# Patient Record
Sex: MALE | Race: BLACK OR AFRICAN AMERICAN | NOT HISPANIC OR LATINO | URBAN - METROPOLITAN AREA
[De-identification: names, ages, dates, MRNs, and addresses within clinical notes are randomized per-mention and may not be internally consistent; named-entity substitution may affect disease eponyms.]

---

## 2018-08-01 ENCOUNTER — INPATIENT (INPATIENT)
Facility: HOSPITAL | Age: 38
LOS: 1 days | Discharge: HOME | DRG: 682 | End: 2018-08-03
Attending: HOSPITALIST | Admitting: HOSPITALIST
Payer: COMMERCIAL

## 2018-08-01 VITALS
SYSTOLIC BLOOD PRESSURE: 129 MMHG | TEMPERATURE: 97 F | HEART RATE: 75 BPM | RESPIRATION RATE: 20 BRPM | OXYGEN SATURATION: 99 % | DIASTOLIC BLOOD PRESSURE: 77 MMHG

## 2018-08-01 DIAGNOSIS — I10 ESSENTIAL (PRIMARY) HYPERTENSION: ICD-10-CM

## 2018-08-01 DIAGNOSIS — I77.0 ARTERIOVENOUS FISTULA, ACQUIRED: Chronic | ICD-10-CM

## 2018-08-01 DIAGNOSIS — D64.9 ANEMIA, UNSPECIFIED: ICD-10-CM

## 2018-08-01 DIAGNOSIS — R63.8 OTHER SYMPTOMS AND SIGNS CONCERNING FOOD AND FLUID INTAKE: ICD-10-CM

## 2018-08-01 DIAGNOSIS — Z29.9 ENCOUNTER FOR PROPHYLACTIC MEASURES, UNSPECIFIED: ICD-10-CM

## 2018-08-01 DIAGNOSIS — Z91.89 OTHER SPECIFIED PERSONAL RISK FACTORS, NOT ELSEWHERE CLASSIFIED: ICD-10-CM

## 2018-08-01 DIAGNOSIS — T86.11 KIDNEY TRANSPLANT REJECTION: Chronic | ICD-10-CM

## 2018-08-01 DIAGNOSIS — N18.6 END STAGE RENAL DISEASE: ICD-10-CM

## 2018-08-01 DIAGNOSIS — R74.8 ABNORMAL LEVELS OF OTHER SERUM ENZYMES: ICD-10-CM

## 2018-08-01 LAB
ALBUMIN SERPL ELPH-MCNC: 4.3 G/DL — SIGNIFICANT CHANGE UP (ref 3.3–5)
ALP SERPL-CCNC: 155 U/L — HIGH (ref 40–120)
ALT FLD-CCNC: 6 U/L — LOW (ref 10–45)
ANION GAP SERPL CALC-SCNC: 22 MMOL/L — HIGH (ref 5–17)
AST SERPL-CCNC: 17 U/L — SIGNIFICANT CHANGE UP (ref 10–40)
BASOPHILS NFR BLD AUTO: 0.2 % — SIGNIFICANT CHANGE UP (ref 0–2)
BILIRUB SERPL-MCNC: 0.5 MG/DL — SIGNIFICANT CHANGE UP (ref 0.2–1.2)
BUN SERPL-MCNC: 66 MG/DL — HIGH (ref 7–23)
CALCIUM SERPL-MCNC: 8.5 MG/DL — SIGNIFICANT CHANGE UP (ref 8.4–10.5)
CHLORIDE SERPL-SCNC: 94 MMOL/L — LOW (ref 96–108)
CO2 SERPL-SCNC: 21 MMOL/L — LOW (ref 22–31)
CREAT SERPL-MCNC: 14.32 MG/DL — HIGH (ref 0.5–1.3)
EOSINOPHIL NFR BLD AUTO: 2.1 % — SIGNIFICANT CHANGE UP (ref 0–6)
FERRITIN SERPL-MCNC: 1334 NG/ML — HIGH (ref 30–400)
GLUCOSE SERPL-MCNC: 98 MG/DL — SIGNIFICANT CHANGE UP (ref 70–99)
HBV CORE AB SER-ACNC: SIGNIFICANT CHANGE UP
HBV SURFACE AB SER-ACNC: REACTIVE — SIGNIFICANT CHANGE UP
HBV SURFACE AG SER-ACNC: SIGNIFICANT CHANGE UP
HCT VFR BLD CALC: 25 % — LOW (ref 39–50)
HCV AB S/CO SERPL IA: 0.12 S/CO — SIGNIFICANT CHANGE UP
HCV AB SERPL-IMP: SIGNIFICANT CHANGE UP
HGB BLD-MCNC: 7.7 G/DL — LOW (ref 13–17)
HIV 1+2 AB+HIV1 P24 AG SERPL QL IA: SIGNIFICANT CHANGE UP
IRON SATN MFR SERPL: 19 % — SIGNIFICANT CHANGE UP (ref 16–55)
IRON SATN MFR SERPL: 33 UG/DL — LOW (ref 45–165)
LIDOCAIN IGE QN: 86 U/L — HIGH (ref 7–60)
LYMPHOCYTES # BLD AUTO: 13.7 % — SIGNIFICANT CHANGE UP (ref 13–44)
MCHC RBC-ENTMCNC: 29.8 PG — SIGNIFICANT CHANGE UP (ref 27–34)
MCHC RBC-ENTMCNC: 30.8 G/DL — LOW (ref 32–36)
MCV RBC AUTO: 96.9 FL — SIGNIFICANT CHANGE UP (ref 80–100)
MONOCYTES NFR BLD AUTO: 10.8 % — SIGNIFICANT CHANGE UP (ref 2–14)
NEUTROPHILS NFR BLD AUTO: 73.2 % — SIGNIFICANT CHANGE UP (ref 43–77)
PHOSPHATE SERPL-MCNC: 6.3 MG/DL — HIGH (ref 2.5–4.5)
PLATELET # BLD AUTO: 129 K/UL — LOW (ref 150–400)
POTASSIUM SERPL-MCNC: 5.5 MMOL/L — HIGH (ref 3.5–5.3)
POTASSIUM SERPL-SCNC: 5.5 MMOL/L — HIGH (ref 3.5–5.3)
PROT SERPL-MCNC: 7.3 G/DL — SIGNIFICANT CHANGE UP (ref 6–8.3)
RBC # BLD: 2.58 M/UL — LOW (ref 4.2–5.8)
RBC # FLD: 15.4 % — SIGNIFICANT CHANGE UP (ref 10.3–16.9)
SODIUM SERPL-SCNC: 137 MMOL/L — SIGNIFICANT CHANGE UP (ref 135–145)
TIBC SERPL-MCNC: 175 UG/DL — LOW (ref 220–430)
TRANSFERRIN SERPL-MCNC: 125 MG/DL — LOW (ref 200–360)
UIBC SERPL-MCNC: 142 UG/DL — SIGNIFICANT CHANGE UP (ref 110–370)
WBC # BLD: 4.4 K/UL — SIGNIFICANT CHANGE UP (ref 3.8–10.5)
WBC # FLD AUTO: 4.4 K/UL — SIGNIFICANT CHANGE UP (ref 3.8–10.5)

## 2018-08-01 PROCEDURE — 93010 ELECTROCARDIOGRAM REPORT: CPT

## 2018-08-01 PROCEDURE — 99223 1ST HOSP IP/OBS HIGH 75: CPT | Mod: GC

## 2018-08-01 PROCEDURE — 99285 EMERGENCY DEPT VISIT HI MDM: CPT | Mod: 25

## 2018-08-01 PROCEDURE — 99222 1ST HOSP IP/OBS MODERATE 55: CPT

## 2018-08-01 RX ORDER — LABETALOL HCL 100 MG
400 TABLET ORAL EVERY 8 HOURS
Qty: 0 | Refills: 0 | Status: DISCONTINUED | OUTPATIENT
Start: 2018-08-01 | End: 2018-08-03

## 2018-08-01 RX ORDER — FAMOTIDINE 10 MG/ML
20 INJECTION INTRAVENOUS ONCE
Qty: 0 | Refills: 0 | Status: DISCONTINUED | OUTPATIENT
Start: 2018-08-01 | End: 2018-08-01

## 2018-08-01 RX ORDER — HYDRALAZINE HCL 50 MG
1 TABLET ORAL
Qty: 0 | Refills: 0 | COMMUNITY

## 2018-08-01 RX ORDER — ERYTHROPOIETIN 10000 [IU]/ML
10000 INJECTION, SOLUTION INTRAVENOUS; SUBCUTANEOUS ONCE
Qty: 0 | Refills: 0 | Status: COMPLETED | OUTPATIENT
Start: 2018-08-01 | End: 2018-08-01

## 2018-08-01 RX ORDER — FERROUS SULFATE 325(65) MG
1 TABLET ORAL
Qty: 0 | Refills: 0 | COMMUNITY

## 2018-08-01 RX ORDER — NIFEDIPINE 30 MG
1 TABLET, EXTENDED RELEASE 24 HR ORAL
Qty: 0 | Refills: 0 | COMMUNITY

## 2018-08-01 RX ORDER — FAMOTIDINE 10 MG/ML
20 INJECTION INTRAVENOUS DAILY
Qty: 0 | Refills: 0 | Status: DISCONTINUED | OUTPATIENT
Start: 2018-08-01 | End: 2018-08-01

## 2018-08-01 RX ORDER — LIDOCAINE 4 G/100G
5 CREAM TOPICAL ONCE
Qty: 0 | Refills: 0 | Status: COMPLETED | OUTPATIENT
Start: 2018-08-01 | End: 2018-08-01

## 2018-08-01 RX ORDER — HYDRALAZINE HCL 50 MG
100 TABLET ORAL EVERY 8 HOURS
Qty: 0 | Refills: 0 | Status: DISCONTINUED | OUTPATIENT
Start: 2018-08-01 | End: 2018-08-01

## 2018-08-01 RX ORDER — ONDANSETRON 8 MG/1
4 TABLET, FILM COATED ORAL ONCE
Qty: 0 | Refills: 0 | Status: COMPLETED | OUTPATIENT
Start: 2018-08-01 | End: 2018-08-01

## 2018-08-01 RX ORDER — LABETALOL HCL 100 MG
2 TABLET ORAL
Qty: 0 | Refills: 0 | COMMUNITY

## 2018-08-01 RX ORDER — NIFEDIPINE 30 MG
60 TABLET, EXTENDED RELEASE 24 HR ORAL
Qty: 0 | Refills: 0 | Status: DISCONTINUED | OUTPATIENT
Start: 2018-08-01 | End: 2018-08-01

## 2018-08-01 RX ORDER — LABETALOL HCL 100 MG
400 TABLET ORAL EVERY 8 HOURS
Qty: 0 | Refills: 0 | Status: DISCONTINUED | OUTPATIENT
Start: 2018-08-01 | End: 2018-08-01

## 2018-08-01 RX ORDER — NIFEDIPINE 30 MG
60 TABLET, EXTENDED RELEASE 24 HR ORAL
Qty: 0 | Refills: 0 | Status: DISCONTINUED | OUTPATIENT
Start: 2018-08-01 | End: 2018-08-03

## 2018-08-01 RX ORDER — FERROUS SULFATE 325(65) MG
325 TABLET ORAL EVERY 12 HOURS
Qty: 0 | Refills: 0 | Status: DISCONTINUED | OUTPATIENT
Start: 2018-08-01 | End: 2018-08-03

## 2018-08-01 RX ORDER — ONDANSETRON 8 MG/1
4 TABLET, FILM COATED ORAL ONCE
Qty: 0 | Refills: 0 | Status: DISCONTINUED | OUTPATIENT
Start: 2018-08-01 | End: 2018-08-01

## 2018-08-01 RX ORDER — HYDRALAZINE HCL 50 MG
100 TABLET ORAL EVERY 8 HOURS
Qty: 0 | Refills: 0 | Status: DISCONTINUED | OUTPATIENT
Start: 2018-08-01 | End: 2018-08-03

## 2018-08-01 RX ADMIN — Medication 400 MILLIGRAM(S): at 20:26

## 2018-08-01 RX ADMIN — Medication 100 MILLIGRAM(S): at 22:59

## 2018-08-01 RX ADMIN — ERYTHROPOIETIN 10000 UNIT(S): 10000 INJECTION, SOLUTION INTRAVENOUS; SUBCUTANEOUS at 17:30

## 2018-08-01 RX ADMIN — Medication 0.3 MILLIGRAM(S): at 20:23

## 2018-08-01 RX ADMIN — Medication 100 MILLIGRAM(S): at 20:27

## 2018-08-01 RX ADMIN — Medication 0.3 MILLIGRAM(S): at 22:59

## 2018-08-01 RX ADMIN — Medication 400 MILLIGRAM(S): at 23:13

## 2018-08-01 NOTE — H&P ADULT - NSHPOUTPATIENTPROVIDERS_GEN_ALL_CORE
has no PCP; recent renal doctor at HD was 1mo ago at [facility] on [address] and [address]

## 2018-08-01 NOTE — CONSULT NOTE ADULT - ASSESSMENT
35 year old male with pmhx HTN and ESRD on HD admitted for HD and DVT.    US of RUE reveals thrombus extending from the right subclavian vein to the basilic vein.  -refused heparin infusion or anticoagulation.     Patient was educated re: risk of untreated dvt including , PE and death. pt understood the risks and stated he still did not want anticoagulation. 38M PMH HTN, ESRD (HD M/W/F, occasionally makes minimal urine) 2/2 PCKD w/ h/o renal transplant 2009 (currently off anti-rejection meds) presented c/o N/V w/ electrolyte abnormalities and need for HD

## 2018-08-01 NOTE — H&P ADULT - PROBLEM SELECTOR PLAN 2
normocytic anemia; pt on iron supplements at home; possible iron deficiency vs ACD; iron studies consistent w/ ACD  -c/w iron supplementation  -f/u renal recs for possible possible procrit needs in future

## 2018-08-01 NOTE — ED PROVIDER NOTE - CHPI ED SYMPTOMS NEG
no dysuria/no fever/no blood in stool/no chills/no burning urination/no abdominal distension/no diarrhea

## 2018-08-01 NOTE — ED PROVIDER NOTE - OBJECTIVE STATEMENT
39 y/o m with h/o HTN , renal failure on dialysis M,W, F. He state of needing dialysis and has been having nausea and epigastric pain since last night. He admit of vomiting x 1 episode. Report of going to [facility] for three weeks for dialysis due to no dialysis center. He admit of being admitted but he left with no arrangements of a continuos dialysis center. Denies fever, diarrhea, sob, chest pain. Makes a small amount of urine. Denies alcohol or drug use.

## 2018-08-01 NOTE — H&P ADULT - FAMILY HISTORY
Mother  Still living? Unknown  Family history of hypertension, Age at diagnosis: Age Unknown  Family history of renal failure, Age at diagnosis: Age Unknown     Sibling  Still living? Unknown  Family history of cancer in brother, Age at diagnosis: Age Unknown

## 2018-08-01 NOTE — H&P ADULT - HISTORY OF PRESENT ILLNESS
38M PMH HTN, ESRD (HD M/W/F) 2/2 PCKD w/ h/o renal transplant 2009 (currently off anti-rejection meds) presents to ED c/o 1d nausea associated w/ acid reflux w/ epigastric pain and 1ep of vomitus NBNB. Pt states his sx were relieved w/ vomiting. Pt last had HD on Monday at [facility] and presents to the ED stating he needs HD. Pt has had HD center set up in the past at [facility]. Due to an argument since 2015 as pt was hospitalized at [facility] for possible hypertensive emergency and told that he no longer needs to continue them presents 38M PMH HTN, ESRD (HD M/W/F, occasionally makes minimal urine) 2/2 PCKD w/ h/o renal transplant 2009 (currently off anti-rejection meds) presents to ED c/o 1d nausea associated w/ acid reflux w/ epigastric pain and 1ep of vomitus NBNB. Pt states his sx were relieved w/ vomiting. Denies F/C/S, CP/pressure, dyspnea, diaphoresis, palpitations. Pt last had HD on Monday at [facility] and presents to the ED stating he needs HD. Pt has had HD center set up in the past at [facility]. Due to an argument at facility, he did not return there and decided to have his HD sessions at various hospitals. Since 2015 as pt was hospitalized at [facility] for possible hypertensive emergency and told that he no longer needs to continue them.    ROS: at this time denies HA, F/C/S, N/V, CP/pressure, palpitations, dyspnea, abdominal pain, diarrhea/constipation  -has discomfort when urination occurs which happened Monday    ED VS: Tmax 98.1F, HR: 70-97, BP: 124//92, RR: 18-20, SpO2: 98%-99% RA  labs: Hb 7.7, K 5.5, Ph 6.3, Bicarb 21, AG 22, Cr 14.32, BUN 66, Alk Phos 155, Lipase 80s  EKG: NSR  -renal consulted in ED and to set up for HD

## 2018-08-01 NOTE — H&P ADULT - PROBLEM SELECTOR PLAN 7
1) PCP Contacted on Admission: (Y/N) --> Name & Phone #: pt w/o PCP or renal doctor  2) Date of Contact with PCP:  3) PCP Contacted at Discharge: (Y/N, N/A)  4) Summary of Handoff Given to PCP:   5) Post-Discharge Appointment Date and Location:

## 2018-08-01 NOTE — H&P ADULT - NSHPSOCIALHISTORY_GEN_ALL_CORE
Tobacco: quit 2001 1/2 pk of black and mild; started at age 14-15 w/ intermittent quitting in between  EtOH: remote use; rare use  Rec: none

## 2018-08-01 NOTE — H&P ADULT - NSHPPHYSICALEXAM_GEN_ALL_CORE
General: WDWN NAD  HEENT: no JVD, no LAD, no carotid bruits  Cardiac: RRR, S1/S2, no M/R/G  Pulm: CTA b/l  Abdomen: BS present, soft, NTND  Extremities: WWP, no edema in LE b/l General: WDWN NAD  HEENT: no JVD, no LAD, no carotid bruits  Cardiac: RRR, S1/S2, no M/R/G  Pulm: CTA b/l  Abdomen: BS present, soft, NTND  Extremities: WWP, no edema in LE b/l  Psych: normal affect  skin: no rash  Neuro: no focal deficits, grossly intact

## 2018-08-01 NOTE — H&P ADULT - PROBLEM SELECTOR PLAN 1
pt w/ hyperkalemia, elevated AG, decreased bicarb, elevated BUN/Cr 2/2 ESRD in need for HD  -seen by renal  -currently undergoing HD pt w/ hyperkalemia, elevated AG, decreased bicarb, elevated BUN/Cr 2/2 ESRD in need for HD  -seen by renal  -currently undergoing HD  will need to establish HD center - has been to [facility] in the past  -Hepatitis B/C neg, HIV neg  -f/u quantiferon

## 2018-08-01 NOTE — ED PROVIDER NOTE - ATTENDING CONTRIBUTION TO CARE
39 yo m presents to ED with concern for abdominal pain with n/v.  Pain located to epigastric area, present x 2 days.  Notes one episode of non bloody, non bilious emesis.  On my face to face ED eval, patient is non toxic in appearance.  HRRR, lungs clear.  Abd soft.  No reproducible ttp throughout.  Labs reviewed.  Given patient is due for dialysis and K slightly elevated anticipate possible need for admission as he missed his appointment today.  Patient appears stable and it is not felt he warrants further abd imaging at this time.  Will admit at this time for dialysis.

## 2018-08-01 NOTE — H&P ADULT - NSHPLABSRESULTS_GEN_ALL_CORE
7.7    4.4   )-----------( 129      ( 01 Aug 2018 12:56 )             25.0   08-01    137  |  94<L>  |  66<H>  ----------------------------<  98  5.5<H>   |  21<L>  |  14.32<H>    Ca    8.5      01 Aug 2018 12:56  Phos  6.3     08-01    TPro  7.3  /  Alb  4.3  /  TBili  0.5  /  DBili  x   /  AST  17  /  ALT  6<L>  /  AlkPhos  155<H>  08-01    Hepatitis B Core Antibody, Total (08.01.18 @ 16:27)    Hepatitis B Core Antibody, Total: Nonreact  Hepatitis B Surface Antigen (08.01.18 @ 16:27)    Hepatitis B Surface Antigen: Nonreact  Hepatitis B Surface Antibody (08.01.18 @ 16:27)    Hepatitis B Surface Antibody: Reactive  Hepatitis C Antibody Test (08.01.18 @ 16:27)    Hepatitis C Virus S/CO Ratio: 0.12 S/CO    Hepatitis C Virus Interpretation: Nonreact: Hepatitis C AB    HIV-1/2 Antigen/Antibody Screen by [facility] (08.01.18 @ 16:27)    HIV-1/2 Combo Result: Nonreact:

## 2018-08-01 NOTE — CONSULT NOTE ADULT - ATTENDING COMMENTS
Seen on HD  Increased UF to 3-3.5kg for dbp 110's on dialysis, per pt usually gets 3-4kg removed on dialysis. Increased session time to 3.5hrs  Please get social wk to see pt re: placement at dialysis unit. He used to go to [facility] on [address] 1 month ago but says there was a disagreement there and has since been going to various ER's.  Epo with HD

## 2018-08-01 NOTE — H&P ADULT - ATTENDING COMMENTS
Pt seen and examined at bedside on 8/1/2018 @ 2200    Agree with HPI, ROS as above. Patient has no complaints. 12 point ROS is negative.    VS, Labs, FH, SH, allergies, medications, imaging reviewed. I personally reviewed the EKG - R axis deviation, possible q waves in anterior leads. I reviewed patient's previous records was seen at [facility] in 2015 at which time he was anemic but refused transfusion and signed out AMA. Agree with physical exam as above     A/P: 38M PMH HTN, ESRD (HD M/W/F, occasionally makes minimal urine) 2/2 PCKD w/ h/o renal transplant 2009 (currently off anti-rejection meds) presented c/o N/V w/ electrolyte abnormalities and need for HD    **ESRD  -HD performed by renal  -SW in AM to help patient set up permanent HD center  -Renal following, appreciate recs    **Hypertensive urgency  -Patient with elevated BP but asymptomatic, 2/2 to not taking his BP meds earlier in the day  -Will give patient back home medications, recheck BP afterwards    Plan otherwise as outlined above.....

## 2018-08-01 NOTE — ED ADULT NURSE NOTE - OBJECTIVE STATEMENT
Patient is a 39yo M, BIBA, AAOx3, in NAD, vitals stable, complaining of abdominal pain, body aches, nausea, vomiting and headache.  Patient requesting pain medicine and dialysis.  Patient presents with LUE AV fistula, +bruit/+thrill, M/W/F dialysis, with last treatment on Monday.  Patient denies any CP, SOB, dizziness, diarrhea, fevers, chills or any other complaints.

## 2018-08-01 NOTE — ED PROVIDER NOTE - MEDICAL DECISION MAKING DETAILS
Patient with renal failure on dialysis missed today. Admitted for dialysis today and possible reestablishment of dialysis center. Well appearing, NAD and VSS.

## 2018-08-01 NOTE — CONSULT NOTE ADULT - SUBJECTIVE AND OBJECTIVE BOX
Patient is a 38y old  Male who presents with a chief complaint of need for dialysis (01 Aug 2018 17:11)  Patient has been getting dialysis reportedly since 2015. He reported that his last dialysis was at [facility] on Monday where he had been admitted for a "dental procedure".      HPI:  38M PMH HTN, ESRD (HD M/W/F) 2/2 PCKD w/ h/o renal transplant 2009 (currently off anti-rejection meds) presents to ED c/o 1d nausea associated w/ acid reflux w/ epigastric pain and 1ep of vomitus NBNB. Pt states his sx were relieved w/ vomiting. Pt last had HD on Monday at [facility] and presents to the ED stating he needs HD. Pt has had HD center set up in the past at [facility]. Due to an argument since 2015 as pt was hospitalized at [facility] for possible hypertensive emergency and told that he no longer needs to continue them presents (01 Aug 2018 17:11)      PAST MEDICAL & SURGICAL HISTORY:  Hypertension secondary to other renal disorders  Homeless  Dialysis patient  Chronic rejection of kidney transplant  AVF (arteriovenous fistula)      Allergies    No Known Drug Allergies  shellfish (Rash)    Intolerances        FAMILY HISTORY:  Family history of cancer in brother (Sibling)  Family history of renal failure (Mother)  Family history of hypertension (Mother)      SOCIAL HISTORY: NAD    MEDICATIONS  (STANDING):  cloNIDine 0.3 milliGRAM(s) Oral every 8 hours  ferrous    sulfate 325 milliGRAM(s) Oral every 12 hours  hydrALAZINE 100 milliGRAM(s) Oral every 8 hours  labetalol 400 milliGRAM(s) Oral every 8 hours  NIFEdipine XL 60 milliGRAM(s) Oral <User Schedule>    MEDICATIONS  (PRN):      REVIEW OF SYSTEMS:    CONSTITUTIONAL: No fever or chills, No fatigue or tiredness.  EYES: No blurred or double vision.  ENT: No recent URI or sore throat  RESPIRATORY: No shortness of breath, cough, hemoptysis  CARDIOVASCULAR: No Chest pain or shortness of breath  GASTROINTESTINAL: NO abdominal or flank pain, No nausea or vomiting, No diarrhea  GENITOURINARY: No dysuria or urinary burning, No difficulty passing urine, No hematuria  NEUROLOGICAL: No headaches or blurred vision  SKIN: No skin rashes   MUSCULOSKELETAL: No arthralgia, Joint pain, leg edema, No muscle pains        PHYSICAL EXAM:    Middle aged male in no distress  CVS: S1S2+ No MRG  Resp: chest clear, no creps, no wheeze  Abd: soft, nontender  Ext: no pedal edema    Access: LUE AV graft              LABS:                                                   08-01-18 @ 12:56    137  |  94<L>  |  66<H>  ----------------------------<  98  5.5<H>   |  21<L>  |  14.32<H>    Ca    8.5      01 Aug 2018 12:56  Phos  6.3     08-01    TPro  7.3  /  Alb  4.3  /  TBili  0.5  /  DBili  x   /  AST  17  /  ALT  6<L>  /  AlkPhos  155<H>  08-01                          7.7    4.4   )-----------( 129      ( 01 Aug 2018 12:56 )             25.0     CBC Full  -  ( 01 Aug 2018 12:56 )  WBC Count : 4.4 K/uL  Hemoglobin : 7.7 g/dL  Hematocrit : 25.0 %  Platelet Count - Automated : 129 K/uL  Mean Cell Volume : 96.9 fL                  RADIOLOGY & ADDITIONAL TESTS:      < from: US Duplex Venous Upper Ext Ltd, Right (10.15.15 @ 14:24) >   US DPLX UPR EXT VEINS LTD RT                             PROCEDURE DATE:  10/15/2015                   INTERPRETATION:  VENOUS DUPLEX DOPPLER ULTRASOUND OF THE VEINS OF THE   RIGHT NECK AND RIGHT UPPER EXTREMITY dated 10/15/2015 2:24 PM    INDICATION: Swelling. Assess for thrombus.     TECHNIQUE: Duplex Doppler evaluation including gray-scale ultrasound   imaging, color flow Doppler imaging, and Doppler spectral analysis of the   veins of the right neck and right upper extremity was performed.    COMPARISON: None.    FINDINGS: The right internal jugular vein is patent and free of thrombus.    A large amount of thrombus is noted within the subclavian vein, axillary   vein, brachial veins, and basilic vein. No thrombus is seen in the   visualized portion of the cephalic vein. The palpable abnormality in the   patient's right axilla corresponds to the thrombosed axillary vein.    IMPRESSION: Extensive right upper extremity thrombosis.      Findings were discussed with Aditya Lutz on 10/15/2015 2:43 PM    with read back.    < end of copied text >

## 2018-08-01 NOTE — H&P ADULT - ASSESSMENT
38M PMH HTN, ESRD (HD M/W/F, occasionally makes minimal urine) 2/2 PCKD w/ h/o renal transplant 2009 (currently off anti-rejection meds) presented c/o N/V w/ electrolyte abnormalities and need for HD

## 2018-08-01 NOTE — ED ADULT NURSE NOTE - NSIMPLEMENTINTERV_GEN_ALL_ED
Implemented All Universal Safety Interventions:  [place] to call system. Call bell, personal items and telephone within reach. Instruct patient to call for assistance. Room bathroom lighting operational. Non-slip footwear when patient is off stretcher. Physically safe environment: no spills, clutter or unnecessary equipment. Stretcher in lowest position, wheels locked, appropriate side rails in place.

## 2018-08-02 DIAGNOSIS — I16.0 HYPERTENSIVE URGENCY: ICD-10-CM

## 2018-08-02 LAB
ANION GAP SERPL CALC-SCNC: 17 MMOL/L — SIGNIFICANT CHANGE UP (ref 5–17)
BUN SERPL-MCNC: 46 MG/DL — HIGH (ref 7–23)
CALCIUM SERPL-MCNC: 9.2 MG/DL — SIGNIFICANT CHANGE UP (ref 8.4–10.5)
CHLORIDE SERPL-SCNC: 93 MMOL/L — LOW (ref 96–108)
CO2 SERPL-SCNC: 29 MMOL/L — SIGNIFICANT CHANGE UP (ref 22–31)
CREAT SERPL-MCNC: 10.47 MG/DL — HIGH (ref 0.5–1.3)
GLUCOSE SERPL-MCNC: 114 MG/DL — HIGH (ref 70–99)
MAGNESIUM SERPL-MCNC: 2.2 MG/DL — SIGNIFICANT CHANGE UP (ref 1.6–2.6)
PHOSPHATE SERPL-MCNC: 5.8 MG/DL — HIGH (ref 2.5–4.5)
POTASSIUM SERPL-MCNC: 5.1 MMOL/L — SIGNIFICANT CHANGE UP (ref 3.5–5.3)
POTASSIUM SERPL-SCNC: 5.1 MMOL/L — SIGNIFICANT CHANGE UP (ref 3.5–5.3)
PTH-INTACT FLD-MCNC: 917 PG/ML — HIGH (ref 15–65)
SODIUM SERPL-SCNC: 139 MMOL/L — SIGNIFICANT CHANGE UP (ref 135–145)

## 2018-08-02 PROCEDURE — 99232 SBSQ HOSP IP/OBS MODERATE 35: CPT | Mod: GC

## 2018-08-02 PROCEDURE — 99232 SBSQ HOSP IP/OBS MODERATE 35: CPT

## 2018-08-02 RX ORDER — NIFEDIPINE 30 MG
60 TABLET, EXTENDED RELEASE 24 HR ORAL ONCE
Qty: 0 | Refills: 0 | Status: COMPLETED | OUTPATIENT
Start: 2018-08-02 | End: 2018-08-02

## 2018-08-02 RX ADMIN — Medication 400 MILLIGRAM(S): at 05:53

## 2018-08-02 RX ADMIN — Medication 0.3 MILLIGRAM(S): at 13:27

## 2018-08-02 RX ADMIN — Medication 100 MILLIGRAM(S): at 21:47

## 2018-08-02 RX ADMIN — Medication 0.3 MILLIGRAM(S): at 21:47

## 2018-08-02 RX ADMIN — Medication 100 MILLIGRAM(S): at 13:28

## 2018-08-02 RX ADMIN — Medication 60 MILLIGRAM(S): at 07:08

## 2018-08-02 RX ADMIN — Medication 60 MILLIGRAM(S): at 19:38

## 2018-08-02 RX ADMIN — Medication 325 MILLIGRAM(S): at 06:51

## 2018-08-02 RX ADMIN — Medication 60 MILLIGRAM(S): at 02:06

## 2018-08-02 RX ADMIN — Medication 100 MILLIGRAM(S): at 05:53

## 2018-08-02 RX ADMIN — Medication 325 MILLIGRAM(S): at 17:22

## 2018-08-02 RX ADMIN — Medication 0.3 MILLIGRAM(S): at 05:55

## 2018-08-02 RX ADMIN — Medication 400 MILLIGRAM(S): at 13:27

## 2018-08-02 RX ADMIN — Medication 400 MILLIGRAM(S): at 21:46

## 2018-08-02 NOTE — DISCHARGE NOTE ADULT - PATIENT PORTAL LINK FT
You can access the [facility] Patient Portal, offered by [facility], by registering with the following website: [email or web address]

## 2018-08-02 NOTE — DISCHARGE NOTE ADULT - PLAN OF CARE
You have end stage renal disease requiring dialysis. You were previously scheduled for Monday/Wednesday/Friday dialysis at [facility], but you no longer go there. You underwent the necessary tests in order for you to be placed at a new dialysis center, but the social workers have not been able to find you placement at an outpatient dialysis center due to some previous episodes of aggression and agitation that you've exhibited at other dialysis centers in the past, which seem to be related to a history of psychiatric disorders. You were offered a psychiatric evaluation and followup to better manage any mental illness you might have, but you declined. Please continue to take your home blood pressure medications. Please continue to take your home blood pressure medications: Hydralazine, Nifedipine, Labetalol, and Clonidine. It is very important that you take these medications every day. Goal You have end stage renal disease requiring dialysis. You are on a Monday/Wednesday/Friday dialysis schedule. Please continue with dialysis M/W/F. To continue dialysis as per your usual schedule Monday Wednesday and Friday and follow up with a nephrologist,

## 2018-08-02 NOTE — DISCHARGE NOTE ADULT - CARE PLAN
Principal Discharge DX:	ESRD (end stage renal disease)  Goal:	Goal  Assessment and plan of treatment:	You have end stage renal disease requiring dialysis. You were previously scheduled for Monday/Wednesday/Friday dialysis at [facility], but you no longer go there. You underwent the necessary tests in order for you to be placed at a new dialysis center, but the social workers have not been able to find you placement at an outpatient dialysis center due to some previous episodes of aggression and agitation that you've exhibited at other dialysis centers in the past, which seem to be related to a history of psychiatric disorders. You were offered a psychiatric evaluation and followup to better manage any mental illness you might have, but you declined.  Secondary Diagnosis:	Hypertension  Goal:	Please continue to take your home blood pressure medications.  Assessment and plan of treatment:	Please continue to take your home blood pressure medications: Hydralazine, Nifedipine, Labetalol, and Clonidine. It is very important that you take these medications every day. Principal Discharge DX:	ESRD (end stage renal disease)  Goal:	Goal  Assessment and plan of treatment:	You have end stage renal disease requiring dialysis. You are on a Monday/Wednesday/Friday dialysis schedule. Please continue with dialysis M/W/F.  Secondary Diagnosis:	Hypertension  Goal:	Please continue to take your home blood pressure medications.  Assessment and plan of treatment:	Please continue to take your home blood pressure medications: Hydralazine, Nifedipine, Labetalol, and Clonidine. It is very important that you take these medications every day. Principal Discharge DX:	ESRD (end stage renal disease)  Goal:	To continue dialysis as per your usual schedule Monday Wednesday and Friday and follow up with a nephrologist,  Assessment and plan of treatment:	You have end stage renal disease requiring dialysis. You are on a Monday/Wednesday/Friday dialysis schedule. Please continue with dialysis M/W/F.  Secondary Diagnosis:	Hypertension  Goal:	Please continue to take your home blood pressure medications.  Assessment and plan of treatment:	Please continue to take your home blood pressure medications: Hydralazine, Nifedipine, Labetalol, and Clonidine. It is very important that you take these medications every day.

## 2018-08-02 NOTE — DISCHARGE NOTE ADULT - MEDICATION SUMMARY - MEDICATIONS TO TAKE
I will START or STAY ON the medications listed below when I get home from the hospital:    cloNIDine 0.3 mg oral tablet  -- 1 tab(s) by mouth 3 times a day  -- Indication: For Hypertension    labetalol 200 mg oral tablet  -- 2 tab(s) by mouth 3 times a day  -- Indication: For Hypertension    NIFEdipine 60 mg oral tablet, extended release  -- 1 tab(s) by mouth 2 times a day  -- Indication: For Hypertension    ferrous sulfate 325 mg (65 mg elemental iron) oral tablet  -- 1 tab(s) by mouth 2 times a day  -- Indication: For Hypertension    hydrALAZINE 100 mg oral tablet  -- 1 tab(s) by mouth 3 times a day  -- Indication: For Hypertension

## 2018-08-02 NOTE — DISCHARGE NOTE ADULT - HOSPITAL COURSE
38M PMH HTN, ESRD (HD M/W/F, occasionally makes minimal urine) 2/2 PCKD w/ h/o renal transplant 2009 (currently off anti-rejection meds) presents to ED c/o 1d nausea associated w/ acid reflux w/ epigastric pain and 1ep of vomitus NBNB. Pt states his sx were relieved w/ vomiting. Denies F/C/S, CP/pressure, dyspnea, diaphoresis, palpitations. Pt last had HD on Monday at [facility] and presents to the ED stating he needs HD. Pt has had [facility] set up [facility] the past [facility]. Due to an argument at facility, he did not return there and decided to have his HD sessions at various hospitals. ROS at this time: denies HA, F/C/S, N/V, CP/pressure, palpitations, dyspnea, abdominal pain, diarrhea/constipation. ED VS: Tmax 98.1F, HR: 70-97, BP: 124//92, RR: 18-20, SpO2: 98%-99% RA labs: Hb 7.7, K 5.5, Ph 6.3, Bicarb 21, AG 22, Cr 14.32, BUN 66, Alk Phos 155, Lipase 80s EKG: NSR. The patient underwent hemodialysis on 8/1 as an inpatient, with resolution in his symptoms. He then requested to be discharged.

## 2018-08-02 NOTE — PROGRESS NOTE ADULT - ATTENDING COMMENTS
poor historian, paranoid thinking from schizophrenia. Tolerating dialysis. Hypertensive urgency, asymp, Dry wt at last dialysis center 3 weeks ago was 88, will challenge. Has been going to various ED's afterwards because his behaviour has had him kicked out of the previous one. Need to find him a dialysis unit, please involve social work and psych.   Dialyzer Optiflux 200  Hyperphosphatemic start renagel  On epo with HD, AOCD  Start hecterol 2mcg IV TIW for secondary hyperparathyroidism  Renal diet  Discussed compliance with bp meds, says he has no problem taking clonidine TID and doesn't want patch. Says bp is "always" high. dialysis unit listed in many documents compliance is a large issue for him.

## 2018-08-03 VITALS
DIASTOLIC BLOOD PRESSURE: 102 MMHG | HEART RATE: 70 BPM | RESPIRATION RATE: 18 BRPM | SYSTOLIC BLOOD PRESSURE: 169 MMHG | OXYGEN SATURATION: 98 % | TEMPERATURE: 98 F

## 2018-08-03 DIAGNOSIS — F20.9 SCHIZOPHRENIA, UNSPECIFIED: ICD-10-CM

## 2018-08-03 LAB
ANION GAP SERPL CALC-SCNC: 18 MMOL/L — HIGH (ref 5–17)
BUN SERPL-MCNC: 65 MG/DL — HIGH (ref 7–23)
CALCIUM SERPL-MCNC: 8.6 MG/DL — SIGNIFICANT CHANGE UP (ref 8.4–10.5)
CALCIUM SERPL-MCNC: 9 MG/DL — SIGNIFICANT CHANGE UP (ref 8.4–10.5)
CHLORIDE SERPL-SCNC: 94 MMOL/L — LOW (ref 96–108)
CO2 SERPL-SCNC: 26 MMOL/L — SIGNIFICANT CHANGE UP (ref 22–31)
CREAT SERPL-MCNC: 13.32 MG/DL — HIGH (ref 0.5–1.3)
GAMMA INTERFERON BACKGROUND BLD IA-ACNC: 0.01 IU/ML — SIGNIFICANT CHANGE UP
GLUCOSE SERPL-MCNC: 132 MG/DL — HIGH (ref 70–99)
HCT VFR BLD CALC: 24.6 % — LOW (ref 39–50)
HGB BLD-MCNC: 7.8 G/DL — LOW (ref 13–17)
M TB IFN-G BLD-IMP: NEGATIVE — SIGNIFICANT CHANGE UP
M TB IFN-G CD4+ BCKGRND COR BLD-ACNC: 0.01 IU/ML — SIGNIFICANT CHANGE UP
M TB IFN-G CD4+CD8+ BCKGRND COR BLD-ACNC: 0.01 IU/ML — SIGNIFICANT CHANGE UP
MCHC RBC-ENTMCNC: 30.6 PG — SIGNIFICANT CHANGE UP (ref 27–34)
MCHC RBC-ENTMCNC: 31.7 G/DL — LOW (ref 32–36)
MCV RBC AUTO: 96.5 FL — SIGNIFICANT CHANGE UP (ref 80–100)
PLATELET # BLD AUTO: 143 K/UL — LOW (ref 150–400)
POTASSIUM SERPL-MCNC: 5.2 MMOL/L — SIGNIFICANT CHANGE UP (ref 3.5–5.3)
POTASSIUM SERPL-SCNC: 5.2 MMOL/L — SIGNIFICANT CHANGE UP (ref 3.5–5.3)
QUANT TB PLUS MITOGEN MINUS NIL: >10 IU/ML — SIGNIFICANT CHANGE UP
RBC # BLD: 2.55 M/UL — LOW (ref 4.2–5.8)
RBC # FLD: 14.7 % — SIGNIFICANT CHANGE UP (ref 10.3–16.9)
SODIUM SERPL-SCNC: 138 MMOL/L — SIGNIFICANT CHANGE UP (ref 135–145)
VIT D25+D1,25 OH+D1,25 PNL SERPL-MCNC: 11.2 PG/ML — LOW (ref 19.9–79.3)
WBC # BLD: 3.3 K/UL — LOW (ref 3.8–10.5)
WBC # FLD AUTO: 3.3 K/UL — LOW (ref 3.8–10.5)

## 2018-08-03 PROCEDURE — 90792 PSYCH DIAG EVAL W/MED SRVCS: CPT

## 2018-08-03 PROCEDURE — 90935 HEMODIALYSIS ONE EVALUATION: CPT

## 2018-08-03 PROCEDURE — 99239 HOSP IP/OBS DSCHRG MGMT >30: CPT

## 2018-08-03 RX ORDER — ERYTHROPOIETIN 10000 [IU]/ML
10000 INJECTION, SOLUTION INTRAVENOUS; SUBCUTANEOUS ONCE
Qty: 0 | Refills: 0 | Status: COMPLETED | OUTPATIENT
Start: 2018-08-03 | End: 2018-08-03

## 2018-08-03 RX ADMIN — ERYTHROPOIETIN 10000 UNIT(S): 10000 INJECTION, SOLUTION INTRAVENOUS; SUBCUTANEOUS at 10:38

## 2018-08-03 RX ADMIN — Medication 400 MILLIGRAM(S): at 05:33

## 2018-08-03 RX ADMIN — Medication 100 MILLIGRAM(S): at 05:34

## 2018-08-03 RX ADMIN — Medication 325 MILLIGRAM(S): at 05:34

## 2018-08-03 RX ADMIN — Medication 0.3 MILLIGRAM(S): at 05:34

## 2018-08-03 RX ADMIN — Medication 60 MILLIGRAM(S): at 07:32

## 2018-08-03 NOTE — BEHAVIORAL HEALTH ASSESSMENT NOTE - DETAILS
as per ACT team, patient has history of becoming aggressive at HD centers, unclear when last happened

## 2018-08-03 NOTE — PROGRESS NOTE ADULT - PROBLEM SELECTOR PLAN 2
Hb 7.7  not iron deficient  Epo with HD
normocytic anemia; pt on iron supplements at home; possible iron deficiency vs ACD; iron studies consistent w/ ACD  -c/w iron supplementation  -f/u renal recs for possible possible procrit needs in future
Hb 7.7  not iron deficient  Epo with HD

## 2018-08-03 NOTE — PROGRESS NOTE ADULT - PROBLEM SELECTOR PLAN 1
- ESRD on MWF via LUE graft  - last HD on 8/1, planned for HD on 8/3  - requests a new HD center  - volume status and electrolytes acceptable
Pt w/ hyperkalemia, elevated AG, decreased bicarb, elevated BUN/Cr 2/2 ESRD in need for HD  -seen by renal  -currently undergoing HD  will need to establish HD center - has been to [facility] in the past  -Hepatitis B/C neg, HIV neg  -f/u quantiferon
- ESRD on MWF via LUE graft  - getting HD today  - requests a new HD center after he had an argument at [facility]  - volume status and electrolytes acceptable

## 2018-08-03 NOTE — BEHAVIORAL HEALTH ASSESSMENT NOTE - HPI (INCLUDE ILLNESS QUALITY, SEVERITY, DURATION, TIMING, CONTEXT, MODIFYING FACTORS, ASSOCIATED SIGNS AND SYMPTOMS)
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  As per primary team, patient has history of schizophrenia and has been rejected from multiple HD centers due to agitation/violence and gets HD at various hospitals/EDs instead.  During this admission patient has denied psychiatric symptoms and been calm and cooperative.  Psychiatry consult called yesterday due to history of schizophrenia but patient refused psych consult; as patient without acute symptoms his wishes were respect.  Later yesterday afternoon I was informed by  Ms. Moshe Gaviria that patient has ACT team and that she had been contacted by Dr. Magaña, ACT team psychiatrist, who expresses belief that patient requires involuntary inpatient psychiatric admission because he has history of violence and agitation and is at risk of death due to treatable mental illness.    Patient seen at bedside this morning by myself and Dr. Graham Louise.  Patient appeared mildly irritable and paranoid through interview but overall was calm and cooperative, answering all questions.  Patient reports he has hypertension and requires dialysis and gets dialysis 3 times a week.  Reports he cannot go to various HD centers "because I don't get along with staff."  Says he believe some of the staff have mental illnesses themselves but says repeatedly "I don't have a mental illness."  Agrees he has an ACT team and says they recommend psychiatric medication for him but he doesn't take it because "I don't have a mental illness."  Reports he last saw ACT team 2 weeks ago and says they often see him at his dialysis center when he has one.  Reports history of 2 inpatient involuntary psychiatric admissions; says "they said I needed to be there" but believes he did not have symptoms meriting admission.  Denies past or present AH/VH and SI/HI.  Denies depressed mood.  Is amenable to follow up with ACT team. 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  As per primary team, patient has history of schizophrenia and has been rejected from multiple HD centers due to agitation/violence and gets HD at various hospitals/EDs instead.  During this admission patient has denied psychiatric symptoms and been calm and cooperative.  Psychiatry consult called yesterday due to history of schizophrenia but patient refused psych consult; as patient without acute symptoms his wishes were respect.  Later yesterday afternoon I was informed by  Ms. Moshe Gaviria that patient has ACT team and that she had been contacted by Dr. Magaña, ACT team psychiatrist, who expresses belief that patient requires involuntary inpatient psychiatric admission because he has history of violence and agitation and is at risk of death due to treatable mental illness.    Patient seen at bedside this morning by myself and Dr. Graham Louise.  Patient appeared mildly irritable and paranoid through interview but overall was calm and cooperative, answering all questions.  Patient reports he has hypertension and requires dialysis and gets dialysis 3 times a week.  Reports he cannot go to various HD centers "because I don't get along with staff."  Says he believe some of the staff have mental illnesses themselves but says repeatedly "I don't have a mental illness."  Agrees he has an ACT team and says they recommend psychiatric medication for him but he doesn't take it because "I don't have a mental illness."  Reports he last saw ACT team 2 weeks ago and says they often see him at his dialysis center when he has one.  Reports history of 2 inpatient involuntary psychiatric admissions; says "they said I needed to be there" but believes he did not have symptoms meriting admission.  Denies past or present AH/VH and SI/HI.  Denies depressed mood.  Is amenable to follow up with ACT team.    Left message for Dr. Magaña, ACT team psychiatrist.

## 2018-08-03 NOTE — PROGRESS NOTE ADULT - PROBLEM SELECTOR PLAN 3
uncontrolled likely due to poor compliance, he is on the following:  cloNIDine 0.3 milliGRAM(s) Oral every 8 hours  hydrALAZINE 100 milliGRAM(s) Oral every 8 hours  labetalol 400 milliGRAM(s) Oral every 8 hours  NIFEdipine XL 60 milliGRAM(s) Oral  monitor BP and adjust meds as needed  increase UF on HD
uncontrolled likely due to poor compliance, he is on the following:  cloNIDine 0.3 milliGRAM(s) Oral every 8 hours  hydrALAZINE 100 milliGRAM(s) Oral every 8 hours  labetalol 400 milliGRAM(s) Oral every 8 hours  NIFEdipine XL 60 milliGRAM(s) Oral  monitor BP and adjust meds as needed
w/ nl LFTs and bili; possible bone disease given ESRD; Ca nl, Ph elevated  -f/u PTH, 25-OH-Vit D

## 2018-08-03 NOTE — PROGRESS NOTE ADULT - SUBJECTIVE AND OBJECTIVE BOX
Patient was seen and examined at bedside. He had no complaints. He had HD  yesterday with [facility]00.    Patient is not forthcoming with information.    He does not give clear details as to where he used to get his regular dialysis, or with regards to his past medical history.    Patient has been getting dialysis reportedly since 2015.      Primary team is trying to establish a dialysis center prior to discharge.       PAST MEDICAL & SURGICAL HISTORY:  Hypertension secondary to other renal disorders  Homeless  Dialysis patient  Chronic rejection of kidney transplant  AVF (arteriovenous fistula)      Allergies    No Known Drug Allergies  shellfish (Rash)      SOCIAL HISTORY: NAD      REVIEW OF SYSTEMS:    nad    Meds:    cloNIDine 0.3 milliGRAM(s) Oral every 8 hours  ferrous    sulfate 325 milliGRAM(s) Oral every 12 hours  hydrALAZINE 100 milliGRAM(s) Oral every 8 hours  labetalol 400 milliGRAM(s) Oral every 8 hours  NIFEdipine XL 60 milliGRAM(s) Oral <User Schedule>      T(C): 36.9 (08-02-18 @ 15:30), Max: 37 (08-01-18 @ 21:26)  HR: 78 (08-02-18 @ 15:30) (77 - 88)  BP: 157/81 (08-02-18 @ 15:30) (157/81 - 203/140)  RR: 18 (08-02-18 @ 15:30) (16 - 19)  SpO2: 98% (08-02-18 @ 15:30) (97% - 100%)    Input/Output      08-01-18 @ 07:01  -  08-02-18 @ 07:00  --------------------------------------------------------  IN: 1000 mL / OUT: 4500 mL / NET: -3500 mL          PHYSICAL EXAM:    Middle aged male in no distress  CVS: S1S2+ No MRG  Resp: chest clear, no creps, no wheeze  Abd: soft, nontender  Ext: no pedal edema    Access: LUE AV graft          LABS:                                     7.7    4.4   )-----------( 129      ( 01 Aug 2018 12:56 )             25.0       08-02    139  |  93<L>  |  46<H>  ----------------------------<  114<H>  5.1   |  29  |  10.47<H>    Ca    9.2      02 Aug 2018 10:54  Phos  5.8     08-02  Mg     2.2     08-02    TPro  7.3  /  Alb  4.3  /  TBili  0.5  /  DBili  x   /  AST  17  /  ALT  6<L>  /  AlkPhos  155<H>  08-01            RADIOLOGY & ADDITIONAL TESTS:      < from: US Duplex Venous Upper Ext Ltd, Right (10.15.15 @ 14:24) >   US DPLX UPR EXT VEINS LTD RT                             PROCEDURE DATE:  10/15/2015                   INTERPRETATION:  VENOUS DUPLEX DOPPLER ULTRASOUND OF THE VEINS OF THE   RIGHT NECK AND RIGHT UPPER EXTREMITY dated 10/15/2015 2:24 PM    INDICATION: Swelling. Assess for thrombus.     TECHNIQUE: Duplex Doppler evaluation including gray-scale ultrasound   imaging, color flow Doppler imaging, and Doppler spectral analysis of the   veins of the right neck and right upper extremity was performed.    COMPARISON: None.    FINDINGS: The right internal jugular vein is patent and free of thrombus.    A large amount of thrombus is noted within the subclavian vein, axillary   vein, brachial veins, and basilic vein. No thrombus is seen in the   visualized portion of the cephalic vein. The palpable abnormality in the   patient's right axilla corresponds to the thrombosed axillary vein.    IMPRESSION: Extensive right upper extremity thrombosis.      Findings were discussed with Aditya Lutz on 10/15/2015 2:43 PM    with read back.    < end of copied text >
Patient was seen and evaluated on dialysis.   Patient is tolerating the procedure well.   HR: 70 (08-03-18 @ 11:55)  BP: 169/102 (08-03-18 @ 11:55)  Continue dialysis:   Dialyzer:      180    QB:   400     QD: 500  Goal UF 2.5-5 kg  over 3 Hours   Patient demanded to be taken off HD after ~ 2 hr and was agressive towards staff
SUBJECTIVE / INTERVAL HPI: DUGLAS  Patient seen and examined at bedside.     VITAL SIGNS:  Vital Signs Last 24 Hrs  T(C): 36.8 (02 Aug 2018 08:40), Max: 37 (01 Aug 2018 21:26)  T(F): 98.3 (02 Aug 2018 08:40), Max: 98.6 (01 Aug 2018 21:26)  HR: 78 (02 Aug 2018 08:40) (70 - 97)  BP: 173/111 (02 Aug 2018 08:40) (124/70 - 203/140)  RR: 16 (02 Aug 2018 08:40) (16 - 20)  SpO2: 98% (02 Aug 2018 08:40) (97% - 100%)    PHYSICAL EXAM:  General: NAD  HEENT: no JVD, no LAD, no carotid bruits  Cardiac: RRR, S1/S2, no M/R/G  Pulm: CTA b/l  Abdomen: BS present, soft, NTND  Extremities: WWP, no edema in LE b/l  Skin: no rash  Neuro: no focal deficits, CN II-XII grossly intact    MEDICATIONS:  cloNIDine 0.3 milliGRAM(s) Oral every 8 hours  ferrous    sulfate 325 milliGRAM(s) Oral every 12 hours  hydrALAZINE 100 milliGRAM(s) Oral every 8 hours  labetalol 400 milliGRAM(s) Oral every 8 hours  NIFEdipine XL 60 milliGRAM(s) Oral <User Schedule>    ALLERGIES:  No Known Drug Allergies  shellfish (Rash)    LABS:              7.7    4.4   )-----------( 129      ( 01 Aug 2018 12:56 )             25.0     08-01    137  |  94<L>  |  66<H>  ----------------------------<  98  5.5<H>   |  21<L>  |  14.32<H>    Ca    8.5      01 Aug 2018 12:56  Phos  6.3     08-01    TPro  7.3  /  Alb  4.3  /  TBili  0.5  /  DBili  x   /  AST  17  /  ALT  6<L>  /  AlkPhos  155<H>  08-01    CAPILLARY BLOOD GLUCOSE    RADIOLOGY & ADDITIONAL TESTS: Reviewed.
Patient was seen and examined at bedside.    Patient has been getting dialysis reportedly since 2015.      Primary team is trying to establish a dialysis center prior to discharge.     Patient used to get dialysis at [place] [facility] but stopped going after an argument.    He was getting his regular HD today,  however while on HD became agressive towards staff and demanded to come off HD after 2 hrs.      PAST MEDICAL & SURGICAL HISTORY:  Hypertension secondary to other renal disorders  Homeless  Dialysis patient  Chronic rejection of kidney transplant  AVF (arteriovenous fistula)      Allergies    No Known Drug Allergies  shellfish (Rash)      SOCIAL HISTORY: NAD      REVIEW OF SYSTEMS:    nad    Meds:    cloNIDine 0.3 milliGRAM(s) Oral every 8 hours  ferrous    sulfate 325 milliGRAM(s) Oral every 12 hours  hydrALAZINE 100 milliGRAM(s) Oral every 8 hours  labetalol 400 milliGRAM(s) Oral every 8 hours  NIFEdipine XL 60 milliGRAM(s) Oral <User Schedule>      T(C): 36.7 (08-03-18 @ 11:55), Max: 37 (08-02-18 @ 21:20)  HR: 70 (08-03-18 @ 11:55) (70 - 82)  BP: 169/102 (08-03-18 @ 11:55) (132/84 - 175/100)  RR: 18 (08-03-18 @ 11:55) (17 - 19)  SpO2: 98% (08-03-18 @ 11:55) (96% - 98%)    Input/Output      08-03-18 @ 07:01  -  08-03-18 @ 17:58  --------------------------------------------------------  IN: 0 mL / OUT: 1900 mL / NET: -1900 mL              PHYSICAL EXAM:    Middle aged male in no distress,   CVS: S1S2+ No MRG  Resp: chest clear, no creps, no wheeze  Abd: soft, nontender  Ext: no pedal edema    Access: LUE AV graft          LABS:                                                       7.8    3.3   )-----------( 143      ( 03 Aug 2018 10:37 )             24.6       08-03    138  |  94<L>  |  65<H>  ----------------------------<  132<H>  5.2   |  26  |  13.32<H>    Ca    9.0      03 Aug 2018 10:37  Phos  5.8     08-02  Mg     2.2     08-02                            RADIOLOGY & ADDITIONAL TESTS:      < from: US Duplex Venous Upper Ext Ltd, Right (10.15.15 @ 14:24) >   US DPLX UPR EXT VEINS LTD RT                             PROCEDURE DATE:  10/15/2015                   INTERPRETATION:  VENOUS DUPLEX DOPPLER ULTRASOUND OF THE VEINS OF THE   RIGHT NECK AND RIGHT UPPER EXTREMITY dated 10/15/2015 2:24 PM    INDICATION: Swelling. Assess for thrombus.     TECHNIQUE: Duplex Doppler evaluation including gray-scale ultrasound   imaging, color flow Doppler imaging, and Doppler spectral analysis of the   veins of the right neck and right upper extremity was performed.    COMPARISON: None.    FINDINGS: The right internal jugular vein is patent and free of thrombus.    A large amount of thrombus is noted within the subclavian vein, axillary   vein, brachial veins, and basilic vein. No thrombus is seen in the   visualized portion of the cephalic vein. The palpable abnormality in the   patient's right axilla corresponds to the thrombosed axillary vein.    IMPRESSION: Extensive right upper extremity thrombosis.      Findings were discussed with Aditya Lutz on 10/15/2015 2:43 PM    with read back.    < end of copied text >

## 2018-08-03 NOTE — PROGRESS NOTE ADULT - ATTENDING COMMENTS
Seen and evaluated on HD, started shouting demanding to be off dialysis after 2.5hrs, hypertensive, after needle removed he got up and walked out of unit, security called to bring pt  back to room

## 2018-08-03 NOTE — PROGRESS NOTE ADULT - ASSESSMENT
38M PMH HTN, ESRD (HD M/W/F, occasionally makes minimal urine) 2/2 PCKD w/ h/o renal transplant 2009 (currently off anti-rejection meds) presented c/o N/V w/ electrolyte abnormalities and need for HD
38M PMH HTN, ESRD (HD M/W/F, occasionally makes minimal urine) 2/2 PCKD w/ h/o renal transplant 2009 (currently off anti-rejection meds) presenting w/ nausea and vomiting w/ electrolyte abnormalities and need for HD.
38M PMH HTN, ESRD (HD M/W/F, occasionally makes minimal urine) 2/2 PCKD w/ h/o renal transplant 2009 (currently off anti-rejection meds) presented c/o N/V w/ electrolyte abnormalities and need for HD     Patient displays agressive behaviour.

## 2018-08-03 NOTE — BEHAVIORAL HEALTH ASSESSMENT NOTE - NSBHADMITCOUNSEL_PSY_A_CORE
instructions for management, treatment and follow up/risk factor reduction/client/family/caregiver education/importance of adherence to chosen treatment/diagnostic results/impressions and/or recommended studies/risks and benefits of treatment options/prognosis

## 2018-08-03 NOTE — BEHAVIORAL HEALTH ASSESSMENT NOTE - NSBHCHARTREVIEWVS_PSY_A_CORE FT
Vital Signs Last 24 Hrs  T(C): 36.7 (03 Aug 2018 11:55), Max: 37 (02 Aug 2018 21:20)  T(F): 98 (03 Aug 2018 11:55), Max: 98.6 (02 Aug 2018 21:20)  HR: 70 (03 Aug 2018 11:55) (70 - 82)  BP: 169/102 (03 Aug 2018 11:55) (132/84 - 175/100)  BP(mean): 125 (03 Aug 2018 05:23) (125 - 125)  RR: 18 (03 Aug 2018 11:55) (17 - 19)  SpO2: 98% (03 Aug 2018 11:55) (96% - 98%)

## 2018-08-03 NOTE — BEHAVIORAL HEALTH ASSESSMENT NOTE - SUMMARY
38M reported h/o schizophrenia, presenting for HD.  Patient unable to stay in HD center reportedly due to agitated/violent behavior so instead comes to ED regularly for HD.  Although this is a nonideal method of obtaining HD, patient demonstrates motivation and consistency in obtaining treatment.  He has not refused HD, labs, or medications here in the hospital.  He has exhibited no agitation and has consistently denied acute psychiatric symptoms.  He appears mildly irritable and paranoid, but these are not indications for involuntary psychiatric admission.  Although as per ACT team patient with pattern of agitation and treatment noncompliance necessitating admission, patient has demonstrated no psychiatric symptoms here that would merit an involuntary inpatient psychiatric admission at this time.

## 2018-08-03 NOTE — BEHAVIORAL HEALTH ASSESSMENT NOTE - NSBHCHARTREVIEWLAB_PSY_A_CORE FT
7.8    3.3   )-----------( 143      ( 03 Aug 2018 10:37 )             24.6   08-03    138  |  94<L>  |  65<H>  ----------------------------<  132<H>  5.2   |  26  |  13.32<H>    Ca    9.0      03 Aug 2018 10:37  Phos  5.8     08-02  Mg     2.2     08-02
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
## 2018-08-13 DIAGNOSIS — Z87.891 PERSONAL HISTORY OF NICOTINE DEPENDENCE: ICD-10-CM

## 2018-08-13 DIAGNOSIS — Z99.2 DEPENDENCE ON RENAL DIALYSIS: ICD-10-CM

## 2018-08-13 DIAGNOSIS — D63.8 ANEMIA IN OTHER CHRONIC DISEASES CLASSIFIED ELSEWHERE: ICD-10-CM

## 2018-08-13 DIAGNOSIS — E87.5 HYPERKALEMIA: ICD-10-CM

## 2018-08-13 DIAGNOSIS — T86.11 KIDNEY TRANSPLANT REJECTION: ICD-10-CM

## 2018-08-13 DIAGNOSIS — I12.0 HYPERTENSIVE CHRONIC KIDNEY DISEASE WITH STAGE 5 CHRONIC KIDNEY DISEASE OR END STAGE RENAL DISEASE: ICD-10-CM

## 2018-08-13 DIAGNOSIS — E83.39 OTHER DISORDERS OF PHOSPHORUS METABOLISM: ICD-10-CM

## 2018-08-13 DIAGNOSIS — F20.9 SCHIZOPHRENIA, UNSPECIFIED: ICD-10-CM

## 2018-08-13 DIAGNOSIS — Z91.013 ALLERGY TO SEAFOOD: ICD-10-CM

## 2018-08-13 DIAGNOSIS — Z59.0 HOMELESSNESS: ICD-10-CM

## 2018-08-13 DIAGNOSIS — N25.81 SECONDARY HYPERPARATHYROIDISM OF RENAL ORIGIN: ICD-10-CM

## 2018-08-13 DIAGNOSIS — R74.8 ABNORMAL LEVELS OF OTHER SERUM ENZYMES: ICD-10-CM

## 2018-08-13 DIAGNOSIS — N18.6 END STAGE RENAL DISEASE: ICD-10-CM

## 2018-08-13 DIAGNOSIS — I16.0 HYPERTENSIVE URGENCY: ICD-10-CM

## 2018-08-13 DIAGNOSIS — Z91.14 PATIENT'S OTHER NONCOMPLIANCE WITH MEDICATION REGIMEN: ICD-10-CM

## 2018-08-13 DIAGNOSIS — R11.2 NAUSEA WITH VOMITING, UNSPECIFIED: ICD-10-CM
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
## 2018-08-13 SDOH — ECONOMIC STABILITY - HOUSING INSECURITY: HOMELESSNESS: Z59.0

## 2018-12-08 NOTE — BEHAVIORAL HEALTH ASSESSMENT NOTE - NS ED BHA HOMICIDALITY PRESENT AGGRESSION OTHERS PAST MONTH
Over the counter medications:  -Start Mucinex twice a day for cough and movement of sputum.  Take with a large glass of water  -Take Ibuprofen 400-600 mg every 4-6 hours or Aleve/naproxen every 12 hours as needed for fever, pain, and inflammation.  Take with food.   -Take Tylenol every 4 hours as needed for additional pain and fever relief.    -Start taking Allegra (fexofenadine), Claritin (loratadine) , or Zyrtec (cetirizine)  for allergies. Generic ok  -Start Benadryl every 6 hours as needed  -Start flonase nasal spray for inflammation in sinuses  -Start a probiotic and/or eat yogurt while on antibiotic    Other things to try for a sore throat:   - Teaspoons of honey   -Try saltwater gargles or drinking broth to help with swelling in the back of your throat. Avoid drinking broth if you have any cardiac issues or swelling in your legs.       Ordered from Pharmacy  -Start Doxycyline twice a day for 10 days     
None known

## 2018-12-26 PROCEDURE — 86706 HEP B SURFACE ANTIBODY: CPT

## 2018-12-26 PROCEDURE — 87340 HEPATITIS B SURFACE AG IA: CPT

## 2018-12-26 PROCEDURE — 99285 EMERGENCY DEPT VISIT HI MDM: CPT | Mod: 25

## 2018-12-26 PROCEDURE — 86803 HEPATITIS C AB TEST: CPT

## 2018-12-26 PROCEDURE — 82310 ASSAY OF CALCIUM: CPT

## 2018-12-26 PROCEDURE — 85027 COMPLETE CBC AUTOMATED: CPT

## 2018-12-26 PROCEDURE — 80048 BASIC METABOLIC PNL TOTAL CA: CPT

## 2018-12-26 PROCEDURE — 80053 COMPREHEN METABOLIC PANEL: CPT

## 2018-12-26 PROCEDURE — 83550 IRON BINDING TEST: CPT

## 2018-12-26 PROCEDURE — 83970 ASSAY OF PARATHORMONE: CPT

## 2018-12-26 PROCEDURE — 93005 ELECTROCARDIOGRAM TRACING: CPT

## 2018-12-26 PROCEDURE — 90935 HEMODIALYSIS ONE EVALUATION: CPT

## 2018-12-26 PROCEDURE — 85025 COMPLETE CBC W/AUTO DIFF WBC: CPT

## 2018-12-26 PROCEDURE — 84466 ASSAY OF TRANSFERRIN: CPT

## 2018-12-26 PROCEDURE — 87389 HIV-1 AG W/HIV-1&-2 AB AG IA: CPT

## 2018-12-26 PROCEDURE — 36415 COLL VENOUS BLD VENIPUNCTURE: CPT

## 2018-12-26 PROCEDURE — 86704 HEP B CORE ANTIBODY TOTAL: CPT

## 2018-12-26 PROCEDURE — 86480 TB TEST CELL IMMUN MEASURE: CPT

## 2018-12-26 PROCEDURE — 83735 ASSAY OF MAGNESIUM: CPT

## 2018-12-26 PROCEDURE — 83690 ASSAY OF LIPASE: CPT

## 2018-12-26 PROCEDURE — 82728 ASSAY OF FERRITIN: CPT

## 2018-12-26 PROCEDURE — 84100 ASSAY OF PHOSPHORUS: CPT

## 2018-12-26 PROCEDURE — 82652 VIT D 1 25-DIHYDROXY: CPT

## 2020-09-30 NOTE — PROGRESS NOTE ADULT - PROBLEM SELECTOR PLAN 4
-c/w home labetalol 400 Q8, nifedipine 60 XL, clonidine 0.3 Q8, hydral 100 Q8 Complex Repair And Graft Additional Text (Will Appearing After The Standard Complex Repair Text): The complex repair was not sufficient to completely close the primary defect. The remaining additional defect was repaired with the graft mentioned below.

## 2021-05-17 NOTE — BEHAVIORAL HEALTH ASSESSMENT NOTE - PERCEPTIONS
Referred To Mid-Level For Closure Text (Leave Blank If You Do Not Want): After obtaining clear surgical margins the patient was sent to a mid-level provider for surgical repair.  The patient understands they will receive post-surgical care and follow-up from the mid-level provider. No abnormalities

## 2023-01-12 NOTE — BEHAVIORAL HEALTH ASSESSMENT NOTE - NS ED B[facility] MED ROS CARDIOVASCULAR
Was seen on 12/30/2022. Says that she was told if her labs were good we would prescribe phentermine to her.    No complaints